# Patient Record
Sex: MALE | Race: WHITE | Employment: OTHER | URBAN - METROPOLITAN AREA
[De-identification: names, ages, dates, MRNs, and addresses within clinical notes are randomized per-mention and may not be internally consistent; named-entity substitution may affect disease eponyms.]

---

## 2022-04-20 ENCOUNTER — TELEPHONE (OUTPATIENT)
Dept: PULMONOLOGY | Age: 87
End: 2022-04-20

## 2022-04-20 DIAGNOSIS — J44.9 CHRONIC OBSTRUCTIVE PULMONARY DISEASE, UNSPECIFIED COPD TYPE (HCC): Primary | ICD-10-CM

## 2022-04-20 RX ORDER — TAMSULOSIN HYDROCHLORIDE 0.4 MG/1
CAPSULE ORAL
COMMUNITY
Start: 2022-02-17

## 2022-04-20 RX ORDER — BUDESONIDE AND FORMOTEROL FUMARATE DIHYDRATE 160; 4.5 UG/1; UG/1
AEROSOL RESPIRATORY (INHALATION)
COMMUNITY
Start: 2022-03-01

## 2022-04-20 RX ORDER — PANTOPRAZOLE SODIUM 40 MG/1
TABLET, DELAYED RELEASE ORAL
COMMUNITY
Start: 2022-03-21

## 2022-04-20 RX ORDER — CITALOPRAM 20 MG/1
TABLET ORAL
COMMUNITY
Start: 2022-01-22

## 2022-04-20 RX ORDER — FLUTICASONE PROPIONATE 50 MCG
SPRAY, SUSPENSION (ML) NASAL
COMMUNITY
Start: 2022-04-15

## 2022-04-20 RX ORDER — LEVOTHYROXINE SODIUM 0.12 MG/1
TABLET ORAL
COMMUNITY
Start: 2022-03-16

## 2022-04-20 RX ORDER — FINASTERIDE 5 MG/1
TABLET, FILM COATED ORAL
COMMUNITY
Start: 2022-04-03

## 2022-04-20 RX ORDER — PREDNISONE 10 MG/1
TABLET ORAL
COMMUNITY
Start: 2022-02-18

## 2022-04-20 RX ORDER — RANOLAZINE 500 MG/1
TABLET, EXTENDED RELEASE ORAL
COMMUNITY
Start: 2022-04-15

## 2022-04-20 RX ORDER — ROSUVASTATIN CALCIUM 20 MG/1
TABLET, COATED ORAL
COMMUNITY
Start: 2022-02-16

## 2022-04-20 NOTE — TELEPHONE ENCOUNTER
self referred for COPD  seen at [facility] by Dr Leena Howell  [phone]  [facility]  Office notes, pft and home o2 eval notes  from [facility] pulmonary Ileene Sober scanned in chart   Would you like any prior testing ?

## 2022-06-16 ENCOUNTER — HOSPITAL ENCOUNTER (OUTPATIENT)
Dept: GENERAL RADIOLOGY | Age: 87
Discharge: HOME | End: 2022-06-16
Payer: MEDICARE

## 2022-06-16 ENCOUNTER — HOSPITAL ENCOUNTER (OUTPATIENT)
Age: 87
Discharge: HOME | End: 2022-06-16
Payer: MEDICARE

## 2022-06-16 DIAGNOSIS — J44.9 CHRONIC OBSTRUCTIVE PULMONARY DISEASE, UNSPECIFIED COPD TYPE (HCC): ICD-10-CM

## 2022-06-16 PROCEDURE — 71046 X-RAY EXAM CHEST 2 VIEWS: CPT

## 2022-06-22 ENCOUNTER — OFFICE VISIT (OUTPATIENT)
Dept: PULMONOLOGY | Age: 87
End: 2022-06-22
Payer: MEDICARE

## 2022-06-22 VITALS
OXYGEN SATURATION: 97 % | SYSTOLIC BLOOD PRESSURE: 132 MMHG | HEIGHT: 72 IN | TEMPERATURE: 96.5 F | DIASTOLIC BLOOD PRESSURE: 70 MMHG | WEIGHT: 230.4 LBS | HEART RATE: 72 BPM | BODY MASS INDEX: 31.21 KG/M2

## 2022-06-22 DIAGNOSIS — R06.02 SHORTNESS OF BREATH: Primary | ICD-10-CM

## 2022-06-22 PROCEDURE — G8427 DOCREV CUR MEDS BY ELIG CLIN: HCPCS | Performed by: INTERNAL MEDICINE

## 2022-06-22 PROCEDURE — 99212 OFFICE O/P EST SF 10 MIN: CPT | Performed by: INTERNAL MEDICINE

## 2022-06-22 PROCEDURE — 1036F TOBACCO NON-USER: CPT | Performed by: INTERNAL MEDICINE

## 2022-06-22 PROCEDURE — 1123F ACP DISCUSS/DSCN MKR DOCD: CPT | Performed by: INTERNAL MEDICINE

## 2022-06-22 PROCEDURE — G8417 CALC BMI ABV UP PARAM F/U: HCPCS | Performed by: INTERNAL MEDICINE

## 2022-06-22 RX ORDER — POLYETHYLENE GLYCOL 3350 17 G/17G
17 POWDER, FOR SOLUTION ORAL DAILY PRN
COMMUNITY

## 2022-06-22 RX ORDER — ASPIRIN 81 MG/1
81 TABLET, CHEWABLE ORAL DAILY
COMMUNITY

## 2023-03-24 ENCOUNTER — OFFICE VISIT (OUTPATIENT)
Dept: PULMONOLOGY | Age: 88
End: 2023-03-24
Payer: MEDICARE

## 2023-03-24 VITALS
OXYGEN SATURATION: 98 % | HEIGHT: 72 IN | BODY MASS INDEX: 31.1 KG/M2 | DIASTOLIC BLOOD PRESSURE: 80 MMHG | SYSTOLIC BLOOD PRESSURE: 136 MMHG | HEART RATE: 75 BPM | WEIGHT: 229.6 LBS

## 2023-03-24 DIAGNOSIS — I25.810 CORONARY ARTERY DISEASE INVOLVING CORONARY BYPASS GRAFT OF NATIVE HEART WITHOUT ANGINA PECTORIS: ICD-10-CM

## 2023-03-24 DIAGNOSIS — G47.33 OBSTRUCTIVE SLEEP APNEA: ICD-10-CM

## 2023-03-24 DIAGNOSIS — I50.42 CHRONIC COMBINED SYSTOLIC AND DIASTOLIC HEART FAILURE (HCC): ICD-10-CM

## 2023-03-24 DIAGNOSIS — J44.9 COPD MIXED TYPE (HCC): Primary | ICD-10-CM

## 2023-03-24 PROBLEM — I25.10 CORONARY ARTERY DISEASE INVOLVING NATIVE CORONARY ARTERY OF NATIVE HEART WITHOUT ANGINA PECTORIS: Status: ACTIVE | Noted: 2023-03-24

## 2023-03-24 PROCEDURE — 1036F TOBACCO NON-USER: CPT | Performed by: INTERNAL MEDICINE

## 2023-03-24 PROCEDURE — 3023F SPIROM DOC REV: CPT | Performed by: INTERNAL MEDICINE

## 2023-03-24 PROCEDURE — G8417 CALC BMI ABV UP PARAM F/U: HCPCS | Performed by: INTERNAL MEDICINE

## 2023-03-24 PROCEDURE — G8427 DOCREV CUR MEDS BY ELIG CLIN: HCPCS | Performed by: INTERNAL MEDICINE

## 2023-03-24 PROCEDURE — G8482 FLU IMMUNIZE ORDER/ADMIN: HCPCS | Performed by: INTERNAL MEDICINE

## 2023-03-24 PROCEDURE — 1123F ACP DISCUSS/DSCN MKR DOCD: CPT | Performed by: INTERNAL MEDICINE

## 2023-03-24 PROCEDURE — 99214 OFFICE O/P EST MOD 30 MIN: CPT | Performed by: INTERNAL MEDICINE

## 2023-03-24 NOTE — PROGRESS NOTES
Yojana Funes (:  1935) is a 80 y.o. male,Established patient, here for evaluation of the following chief complaint(s):  3 Month Follow-Up (COPD)         ASSESSMENT/PLAN:  1. COPD mixed type ([address]  2. 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 Chronic combined systolic and diastolic heart failure (HCC)  Daily functional status with both COPD and chronic heart failure is significantly impaired. In addition he has musculoskeletal problems that require ambulation with a walker. His exercise tolerance is quite limited though. While we measure adequate O2 saturation at rest, it is not known how much he may desaturate with exertion. Monitoring this may be helpful understanding if problems are more pulmonary in origin, may offer a treatment option. He has occasionally used CPAP during the day when he is feeling more short of breath, and this appears to be of some benefit. His exercise tolerance has been declining over the past many months. I suspect this is more likely a result of declining cardiac function. Maintain use of Symbicort and albuterol inhalers    Return in about 6 months (around 2023). Subjective   SUBJECTIVE/OBJECTIVE:  HPI   Yojana Funes returns for follow-up for severe COPD and heart failure. His daily exercise tolerance is fairly low, and appears to be declining over the past several months. He is able to sit at a kitchen counter and prep dinner, but takes a break because he fatigues readily, feels short of breath. He recovers after sitting in his lounge chair for about 10 minutes. He has not observed whether his O2 saturation is maintained with this. Ambulation is limited by fatigue and by musculoskeletal problems. He has had difficulties since suffering a fall last summer and dislocating his right shoulder.   He uses CPAP for treatment of RICCI, and has occasionally used this during the day, finding he gets

## 2023-05-08 ENCOUNTER — HOSPITAL ENCOUNTER (OUTPATIENT)
Dept: SLEEP CENTER | Age: 88
Discharge: HOME | End: 2023-05-08
Payer: MEDICARE

## 2023-05-08 DIAGNOSIS — G47.33 OBSTRUCTIVE SLEEP APNEA: ICD-10-CM

## 2023-05-08 PROCEDURE — 95810 POLYSOM 6/> YRS 4/> PARAM: CPT

## 2023-05-09 PROCEDURE — 95810 POLYSOM 6/> YRS 4/> PARAM: CPT | Performed by: INTERNAL MEDICINE

## 2023-05-31 ENCOUNTER — TELEPHONE (OUTPATIENT)
Dept: PULMONOLOGY | Age: 88
End: 2023-05-31

## 2023-05-31 NOTE — TELEPHONE ENCOUNTER
Pts wife called in and stating that they are waiting on his SS results and would like an update.    [phone]

## 2023-06-27 ENCOUNTER — TELEPHONE (OUTPATIENT)
Dept: PULMONOLOGY | Age: 88
End: 2023-06-27

## 2023-06-27 DIAGNOSIS — G47.33 OSA (OBSTRUCTIVE SLEEP APNEA): Primary | ICD-10-CM

## 2023-07-17 ENCOUNTER — TELEPHONE (OUTPATIENT)
Dept: PULMONOLOGY | Age: 88
End: 2023-07-17

## 2023-07-17 NOTE — TELEPHONE ENCOUNTER
Patients wife called, patient is having worsening shortness of breath  Spo2 reading 96% however patient \"feels like he cannot get all the air out\"    They wanted an appt but next avail was 8/24, they were wanting something sooner.

## 2023-07-18 NOTE — TELEPHONE ENCOUNTER
Scheduled patient for 7/20/23 2pm    Future Appointments   Date Time Provider [address]   7/20/2023  2:00 PM Oscar Oro MD [facility] P[facility]   10/16/2023  4:00 PM Oscar Oro MD [facility] AND [facility]

## 2023-07-19 NOTE — TELEPHONE ENCOUNTER
Ask  Ebcharity if he can come a bit later, like 3 PM.  I've been scheduled in for a bronchoscopy at 1PM, and that could make me late for a 2 PM shelby't  Thanks

## 2023-07-20 ENCOUNTER — OFFICE VISIT (OUTPATIENT)
Dept: PULMONOLOGY | Age: 88
End: 2023-07-20
Payer: MEDICARE

## 2023-07-20 VITALS
SYSTOLIC BLOOD PRESSURE: 132 MMHG | DIASTOLIC BLOOD PRESSURE: 72 MMHG | OXYGEN SATURATION: 92 % | RESPIRATION RATE: 20 BRPM | HEART RATE: 90 BPM

## 2023-07-20 DIAGNOSIS — J44.9 COPD MIXED TYPE (HCC): Primary | ICD-10-CM

## 2023-07-20 DIAGNOSIS — I50.42 CHRONIC COMBINED SYSTOLIC AND DIASTOLIC HEART FAILURE (HCC): ICD-10-CM

## 2023-07-20 DIAGNOSIS — G47.33 OBSTRUCTIVE SLEEP APNEA: ICD-10-CM

## 2023-07-20 PROCEDURE — 1036F TOBACCO NON-USER: CPT | Performed by: INTERNAL MEDICINE

## 2023-07-20 PROCEDURE — 99214 OFFICE O/P EST MOD 30 MIN: CPT | Performed by: INTERNAL MEDICINE

## 2023-07-20 PROCEDURE — 1123F ACP DISCUSS/DSCN MKR DOCD: CPT | Performed by: INTERNAL MEDICINE

## 2023-07-20 PROCEDURE — G8417 CALC BMI ABV UP PARAM F/U: HCPCS | Performed by: INTERNAL MEDICINE

## 2023-07-20 PROCEDURE — G8427 DOCREV CUR MEDS BY ELIG CLIN: HCPCS | Performed by: INTERNAL MEDICINE

## 2023-07-20 PROCEDURE — 3023F SPIROM DOC REV: CPT | Performed by: INTERNAL MEDICINE

## 2023-07-20 RX ORDER — INHALER, ASSIST DEVICES
SPACER (EA) MISCELLANEOUS
Qty: 1 EACH | Refills: 0 | Status: SHIPPED | OUTPATIENT
Start: 2023-07-20

## 2023-07-20 NOTE — PROGRESS NOTES
Isabela Gallardo (:  1935) is a 80 y.o. male,Established patient, here for evaluation of the following chief complaint(s):  Shortness of Breath         ASSESSMENT/PLAN:  1. COPD mixed type ([address])  2. Obstructive sleep apnea  3. Chronic combined systolic and diastolic heart failure (HCC)  Declining exercise function is most consistent with continued heart failure and deconditioning. He has ongoing mild airflow obstruction on PFT on the study just 2 years ago. He does not report improved exercise capacity when using additional bronchodilator therapy. He is advised to use a spacer with his inhalers, prescription is sent. He maintains oxygenation at rest and with exercise. Return in about 3 months (around 10/20/2023). Subjective   SUBJECTIVE/OBJECTIVE:  HPI   Isabela Gallardo returns for follow-up because of declining exercise tolerance. He describes shortness of breath and fatigue with walking shorter and shorter distances. He also describes lightheadedness when he stands up to walk, and if he has been standing on his feet for a longer time. He does not have shortness of breath at rest.  He does not have cough, unless he is choking on something. He notes that he has trouble breathing through his nose, and as a consequence is mouth breathing when he is eating. This is sometimes a problem. He and his wife have monitored O2 saturation at various times, at rest and with exertion, find it is always above 90%. This is consistent with an exercise study performed earlier this year. He is using Symbicort inhaler twice daily but not with a spacer. He uses albuterol inhaler during the day at various times. He does not perceive any real benefit from using either one. He does not have nocturnal awakenings with chest symptoms. He is using CPAP every night, has sleep disruption from unknown causes and has difficulty returning to sleep.            Objective   Physical Exam  Constitutional:

## 2023-07-25 ENCOUNTER — TELEPHONE (OUTPATIENT)
Dept: PULMONOLOGY | Age: 88
End: 2023-07-25

## 2023-07-25 RX ORDER — ALBUTEROL SULFATE 2.5 MG/3ML
2.5 SOLUTION RESPIRATORY (INHALATION) 4 TIMES DAILY PRN
Qty: 120 EACH | Refills: 5 | Status: SHIPPED | OUTPATIENT
Start: 2023-07-25

## 2023-07-25 NOTE — TELEPHONE ENCOUNTER
Patient spouse called for refill on albuterol 0.083 % nebulizer solution  [facility] [address]   Please refill if appropriate

## 2023-09-11 ENCOUNTER — TELEPHONE (OUTPATIENT)
Dept: PULMONOLOGY | Age: 88
End: 2023-09-11

## 2023-09-11 NOTE — TELEPHONE ENCOUNTER
Kirby Doss wife called and would like a return call. She is asking if Maura Badillo could get a letter or waiver so he doesn't have to do the 2nd part of his sleep study. It has been explained to her before that he would have to complete the 2nd step to determine correct setting.

## 2023-09-13 NOTE — TELEPHONE ENCOUNTER
He does not require a second (itration) study. We have documentation of sleep apnea, needed to order the replacement machine. All we need is his prior machine settings. Does he have that, or do we have that in his record?

## 2024-01-11 RX ORDER — ALBUTEROL SULFATE 2.5 MG/3ML
2.5 SOLUTION RESPIRATORY (INHALATION) 4 TIMES DAILY PRN
Qty: 120 EACH | Refills: 5 | Status: SHIPPED | OUTPATIENT
Start: 2024-01-11

## 2024-02-29 ENCOUNTER — OFFICE VISIT (OUTPATIENT)
Dept: PULMONOLOGY | Age: 89
End: 2024-02-29
Payer: MEDICARE

## 2024-02-29 VITALS
HEIGHT: 72 IN | DIASTOLIC BLOOD PRESSURE: 62 MMHG | BODY MASS INDEX: 31.21 KG/M2 | OXYGEN SATURATION: 97 % | HEART RATE: 85 BPM | SYSTOLIC BLOOD PRESSURE: 116 MMHG | WEIGHT: 230.4 LBS

## 2024-02-29 DIAGNOSIS — G47.33 OBSTRUCTIVE SLEEP APNEA: Primary | ICD-10-CM

## 2024-02-29 DIAGNOSIS — J44.9 COPD MIXED TYPE (HCC): ICD-10-CM

## 2024-02-29 PROCEDURE — G8427 DOCREV CUR MEDS BY ELIG CLIN: HCPCS | Performed by: INTERNAL MEDICINE

## 2024-02-29 PROCEDURE — G8417 CALC BMI ABV UP PARAM F/U: HCPCS | Performed by: INTERNAL MEDICINE

## 2024-02-29 PROCEDURE — 3023F SPIROM DOC REV: CPT | Performed by: INTERNAL MEDICINE

## 2024-02-29 PROCEDURE — 1123F ACP DISCUSS/DSCN MKR DOCD: CPT | Performed by: INTERNAL MEDICINE

## 2024-02-29 PROCEDURE — 99214 OFFICE O/P EST MOD 30 MIN: CPT | Performed by: INTERNAL MEDICINE

## 2024-02-29 PROCEDURE — 1036F TOBACCO NON-USER: CPT | Performed by: INTERNAL MEDICINE

## 2024-02-29 PROCEDURE — G8484 FLU IMMUNIZE NO ADMIN: HCPCS | Performed by: INTERNAL MEDICINE

## 2024-02-29 NOTE — PROGRESS NOTES
Chandan Alexander (:  1935) is a 88 y.o. male,Established patient, here for evaluation of the following chief complaint(s):  Follow-up (RICCI)         ASSESSMENT/PLAN:  1. Obstructive sleep apnea  2. COPD mixed type (HCC)  Obstructive sleep apnea is treated effectively with CPAP at 13 cm.  Current compliance report indicates usage on 100% of nights for the past 2 months.  He has some leak, and AHI is 6.0.  He reports effective sleep, awakens feeling refreshed.  If he naps during the day he will use the machine, and has benefit from that.  He clearly benefits from continued use of CPAP for treatment of RICCI.  COPD has been fairly stable, without exacerbations or lower respiratory infections.  He has a stable level of exercise intolerance, related to multiple problems, not just shortness of breath.  Continuing Symbicort    Return in about 6 months (around 2024).         Subjective   SUBJECTIVE/OBJECTIVE:  HPI   Chandan Alexander returns for follow-up, for 90-day out evaluation of CPAP treatment for RICCI.  He has been on CPAP 13 cm and is using this very consistently.  Is usage days is 60 out of 60 for the last 2 months, averaging 13 hours 40 minutes/day.  In addition to a long night's sleep, he may use it during the day for nap.  He reports good quality sleep, awakens feeling refreshed.  He does not have daytime sleepiness or cognitive dysfunction.  He does not report any significant problems with use of the mask or machine.  He is using a chinstrap to limit limit air leak.  AHI on CPAP is 6.0.  He has continued exercise limitation but does not feel that he has any worsening shortness of breath over the past year, or longer.  He walks with a walker, has leg weakness.  He stops for fatigue more than dyspnea..  Vision problems also limit his ability to get about, since he is no longer able to drive           Objective   Physical Exam  Constitutional:       Appearance: He is well-developed and normal weight.

## 2024-08-26 ENCOUNTER — TELEPHONE (OUTPATIENT)
Dept: PULMONOLOGY | Age: 89
End: 2024-08-26

## 2024-08-26 NOTE — TELEPHONE ENCOUNTER
Spouse Mendez called to let you know Chandan   Has been in the hospital and will be discharged home on hospice care.   Future appts will be cancelled at this time